# Patient Record
Sex: FEMALE | Race: WHITE | URBAN - METROPOLITAN AREA
[De-identification: names, ages, dates, MRNs, and addresses within clinical notes are randomized per-mention and may not be internally consistent; named-entity substitution may affect disease eponyms.]

---

## 2017-10-30 ENCOUNTER — TELEPHONE (OUTPATIENT)
Dept: MIDWIFE SERVICES | Facility: CLINIC | Age: 22
End: 2017-10-30

## 2017-10-30 NOTE — TELEPHONE ENCOUNTER
Patient states that she was seen with us a few years back and [facility] had referred her to see a urologist. She is needing to contact that location but does not know the name of the clinic nor the phone number. I am not showing any referrals to a urologist in her chart, but she says she was referred and went elsewhere to get seen. Would someone please contact her back to see if you can find where she was referred to and the contact info please. Thank you. [phone] (home)     [employer].

## 2017-10-31 NOTE — TELEPHONE ENCOUNTER
TC to pt.  Says she figured out that she went to [facility].  No further help needed.  Ariela Miller RN